# Patient Record
Sex: FEMALE | Race: OTHER | HISPANIC OR LATINO | Employment: FULL TIME | URBAN - METROPOLITAN AREA
[De-identification: names, ages, dates, MRNs, and addresses within clinical notes are randomized per-mention and may not be internally consistent; named-entity substitution may affect disease eponyms.]

---

## 2018-05-17 ENCOUNTER — HOSPITAL ENCOUNTER (EMERGENCY)
Facility: HOSPITAL | Age: 28
Discharge: HOME | End: 2018-05-18
Attending: EMERGENCY MEDICINE | Admitting: EMERGENCY MEDICINE
Payer: COMMERCIAL

## 2018-05-17 VITALS
TEMPERATURE: 97.6 F | HEART RATE: 95 BPM | RESPIRATION RATE: 20 BRPM | OXYGEN SATURATION: 99 % | DIASTOLIC BLOOD PRESSURE: 58 MMHG | SYSTOLIC BLOOD PRESSURE: 126 MMHG | WEIGHT: 142 LBS

## 2018-05-17 DIAGNOSIS — J06.9 URI (UPPER RESPIRATORY INFECTION): Primary | ICD-10-CM

## 2018-05-17 RX ADMIN — DEXAMETHASONE SODIUM PHOSPHATE 10 MG: 10 INJECTION, SOLUTION INTRAMUSCULAR; INTRAVENOUS at 23:55

## 2018-05-18 PROCEDURE — 99283 EMERGENCY DEPT VISIT LOW MDM: CPT

## 2018-05-18 NOTE — ED PROVIDER NOTES
History  Chief Complaint   Patient presents with    Cough     Patient reports cough for the past three days along with a headache and chest congestion  Patient denies fevers  32 yof with uri sx  Onset yesterday  Tactile fever  Cough, non productive  Sore throat  No a/e factors  No other assoc sx  Denies cp, sob  Does smoke  Sick contacts include sig other  Exam benign  A/P: uri  supporitve care  None       History reviewed  No pertinent past medical history  History reviewed  No pertinent surgical history  History reviewed  No pertinent family history  I have reviewed and agree with the history as documented  Social History   Substance Use Topics    Smoking status: Current Every Day Smoker     Packs/day: 0 50     Types: Cigarettes    Smokeless tobacco: Never Used    Alcohol use Yes        Review of Systems   Constitutional: Negative for chills, fatigue and fever  HENT: Positive for sore throat  Eyes: Negative for photophobia and visual disturbance  Respiratory: Positive for cough  Negative for shortness of breath  Cardiovascular: Negative for chest pain, palpitations and leg swelling  Gastrointestinal: Negative for diarrhea, nausea and vomiting  Endocrine: Negative for polydipsia and polyuria  Genitourinary: Negative for decreased urine volume, difficulty urinating, dysuria and frequency  Musculoskeletal: Negative for back pain, neck pain and neck stiffness  Skin: Negative for color change and rash  Allergic/Immunologic: Negative for environmental allergies and immunocompromised state  Neurological: Negative for dizziness and headaches  Hematological: Negative for adenopathy  Does not bruise/bleed easily  Psychiatric/Behavioral: Negative for dysphoric mood  The patient is not nervous/anxious          Physical Exam  ED Triage Vitals [05/17/18 2225]   Temperature Pulse Respirations Blood Pressure SpO2   97 6 °F (36 4 °C) 95 20 126/58 99 %      Temp Source Heart Rate Source Patient Position - Orthostatic VS BP Location FiO2 (%)   Tympanic Monitor Sitting Left arm --      Pain Score       9           Orthostatic Vital Signs  Vitals:    05/17/18 2225   BP: 126/58   Pulse: 95   Patient Position - Orthostatic VS: Sitting       Physical Exam   Constitutional: She is oriented to person, place, and time  She appears well-developed and well-nourished  No distress  HENT:   Head: Normocephalic and atraumatic  Nose: Nose normal    Eyes: Conjunctivae and EOM are normal  Pupils are equal, round, and reactive to light  No scleral icterus  Neck: Normal range of motion  Neck supple  No JVD present  No tracheal deviation present  No thyromegaly present  Cardiovascular: Normal rate, regular rhythm, normal heart sounds and intact distal pulses  Exam reveals no gallop and no friction rub  Pulmonary/Chest: Effort normal and breath sounds normal  No respiratory distress  She has no wheezes  She has no rales  She exhibits no tenderness  Abdominal: Soft  Bowel sounds are normal  She exhibits no distension and no mass  There is no tenderness  There is no rebound and no guarding  No hernia  Musculoskeletal: Normal range of motion  She exhibits no edema, tenderness or deformity  Neurological: She is alert and oriented to person, place, and time  She has normal reflexes  No cranial nerve deficit  Coordination normal    Skin: Skin is warm and dry  She is not diaphoretic  No erythema  Psychiatric: She has a normal mood and affect  Her behavior is normal    Nursing note and vitals reviewed        ED Medications  Medications   dexamethasone 10 mg/mL oral liquid 10 mg 1 mL (10 mg Oral Given 5/17/18 4047)       Diagnostic Studies  Results Reviewed     None                 No orders to display         Procedures  Procedures      Phone Consults  ED Phone Contact    ED Course                               MDM  Number of Diagnoses or Management Options  URI (upper respiratory infection): new and requires workup    CritCare Time    Disposition  Final diagnoses:   URI (upper respiratory infection)     Time reflects when diagnosis was documented in both MDM as applicable and the Disposition within this note     Time User Action Codes Description Comment    5/17/2018 11:36 PM Michelle Roach Add [J06 9] URI (upper respiratory infection)       ED Disposition     ED Disposition Condition Comment    Discharge  [facility] discharge to home/self care  Condition at discharge: Good        Follow-up Information     Follow up With Specialties Details Why Contact Info    Philip Mejia DO Family Medicine  If symptoms worsen [address]  [place] [phone]          There are no discharge medications for this patient  No discharge procedures on file  ED Provider  Attending physically available and evaluated Eneida Malik I managed the patient along with the ED Attending      Electronically Signed by         Omar Aguilar DO  05/18/18 0119

## 2018-05-18 NOTE — DISCHARGE INSTRUCTIONS
Bronquitis aguda   LO QUE NECESITA SABER:   La bronquitis aguda es la inflamación e irritación de juju vías respiratorias  Esta irritación puede provocar que tosa o que tenga otros problemas de respiración  La bronquitis aguda suele comenzar debido a otra enfermedad, coby un resfriado o la gripe  La enfermedad se propaga de griffin nariz y garganta a griffin tráquea y Reesa Clement  La bronquitis a menudo es conocida coby resfriado de pecho  La bronquitis aguda generalmente dura alrededor de 3 a 6 semanas y no es median enfermedad grave  La tos podría durar por varias semanas  INSTRUCCIONES SOBRE EL SINA HOSPITALARIA:   Regrese a la zahra de emergencias si:   · Usted expectora donell  · Juju labios o uñas de los dedos se ponen azules  · Usted siente que no está recibiendo suficiente aire cuando respira  Pregúntele a griffin Jocy Diesel vitaminas y minerales son adecuados para usted  · Usted tiene fiebre  · Juju problemas respiratorios no desaparecen o empeoran  · Griffin tos no mejora dentro de 4 semanas  · Usted tiene preguntas o inquietudes acerca de griffin condición o cuidado  Cuidados personales:   · Descanse más  El descanso ayuda a griffin cuerpo a sanar  Empiece a hacer un poco más día a día  Descanse cuando usted sienta que es necesario  · Evite irritantes en el aire  Evite productos químicos, gases y polvo  Use medina mascarilla si tiene que trabajar alrededor de polvo o gases  Permanezca dentro cuando los niveles de contaminación maryam altos  Si tiene alergias, permanezca adentro cuando el conteo de polen sea CROSSCANONBY  No use productos en aerosol, coby desodorante, aerosol contra los insectos y aerosol para el nohemi  · No fume o esté alrededor de personas que fuman  La nicotina y otros químicos en los cigarrillos y cigarros dañan la cilia que saca la mucosidad de juju pulmones  Pida información a griffin médico si usted actualmente fuma y necesita ayuda para dejar de fumar   Los cigarrillos electrónicos o tabaco sin humo todavía contienen nicotina  Consulte con griffin médico antes de [employer]  · [address] se le haya indicado  Los líquidos ayudan a mantener humectadas darryl vías respiratorias y ayudarle a eliminar las flemas por medio de la tos  Es posible que usted necesite joey más líquidos si tiene bronquitis [place]  Pregunte cuánto líquido debe joey cada día y cuáles líquidos son los más adecuados para usted  · Use un humidificador o vaporizador  Use un humidificador de raya frío o un vaporizador para elevar la humedad en griffin casa  [place] podría ayudarle a respirar más fácilmente y al mismo tiempo disminuir la tos  Disminuya griffin riesgo para la bronquitis aguda:   · Vaya a que le pongan las vacunas necesarias  Pregúntele a griffin médico si usted debería ser vacunado contra la gripe o la neumonía  · Evite la propagación de gérmenes  Usted puede disminuir griffin riesgo de bronquitis [place] y otras enfermedades de la siguiente manera:     ¨ [place] frecuentemente con agua y [place]  Lleve medina loción o gel antiséptico para las alcira  Usted puede usar la loción o el gel para limpiar darryl alcira cuando no haya agua disponible  ¨ No se toque los ojos, la nariz o la boca a menos que se haya lavado las alcira ponce  ¨ Siempre cubra griffin boca al toser para evitar la propagación de gérmenes  Lo mejor es toser en un pañuelo desechable o en la manga de griffin camisa, en lugar de en griffin mano  [place] a los que le rodean que cubran darryl bocas al toser  ¨ Trate de evitar a las personas que están resfriadas o tienen gripe  Si usted está enfermo, manténgase alejado de otras personas lo más que pueda  Medicamentos:  Griffin médico podría  darle cualquiera de lo siguiente:  · El ibuprofeno o el acetaminofeno  son medicamentos que pueden ayudar a bajar griffin fiebre  Están disponibles sin receta médica  Consulte con griffin médico cuál medicamento es el adecuado para usted  Pregunte la cantidad y la frecuencia con que debe tomarlos  [address]  Estos medicamentos pueden provocar sangrado estomacal si no se adrián correctamente  El ibuprofeno puede provocar daño al [place] K[place]  No tome ibuprofeno si usted tiene enfermedad de los riñones, [place] o si es alérgico a la aspirina  El acetaminofeno puede dañar el hígado  No tome más de 4,000 miligramos en 24 horas  · Los descongestionantes  ayudan a despejar la mucosidad en darryl pulmones y que sea más fácil expectorarla  [place] puede ayudarle a respirar mejor  · Los jarabes para la tos  disminuyen foster necesidad de toser  Si foster tos produce mucosidad, no tome un supresor de la tos a menos que foster médico se lo indique  Foster médico podría sugerirle que tome un supresor de la tos en la noche para que pueda descansar  · Inhaladores  podrían ser Goldie Giovani  Foster médico podría darle connor o más inhaladores para ayudarle a respirar más fácil y [place] menos tos  Un inhalador le administra medicamento para abrir darryl vías aéreas  Pídale a foster médico que le muestre cómo usar foster inhalador correctamente  · Nason darryl medicamentos coby se le haya indicado  Consulte con foster médico si usted caryl que foster medicamento no le está ayudando o si presenta efectos secundarios  Infórmele si es alérgico a algún medicamento  Mantenga medina lista actualizada de los [place], las vitaminas y los productos herbales que ric  Incluya los siguientes datos de los medicamentos: cantidad, frecuencia y motivo de administración  Traiga con usted la lista o los envases de la píldoras a darryl citas de seguimiento  Lleve la lista de los medicamentos con usted en taylor de medina emergencia  Acuda a darryl consultas de control con foster médico según le indicaron  Escriba las preguntas que tenga para que recuerde hacerlas suad darryl citas de seguimiento  © 2017 [address] Information is for End User's use only and may not be sold, redistributed or otherwise used for commercial purposes   All illustrations and images included in [place] are the copyrighted property of A D A M , Inc  or Monty Neal  Esta información es sólo para uso en educación  Griffin intención no es darle un consejo médico sobre enfermedades o tratamientos  Colsulte con griffin Bary Bert farmacéutico antes de seguir cualquier régimen médico para saber si es seguro y efectivo para usted

## 2018-05-18 NOTE — ED ATTENDING ATTESTATION
Jose Luis MCMAHON DO, saw and evaluated the patient  I have discussed the patient with the resident/non-physician practitioner and agree with the resident's/non-physician practitioner's findings, Plan of Care, and MDM as documented in the resident's/non-physician practitioner's note, except where noted  All available labs and Radiology studies were reviewed  At this point I agree with the current assessment done in the Emergency Department  I have conducted an independent evaluation of this patient a history and physical is as follows:    70-year-old female presents with cough and congestion  Patient is a smoker but denies other medical problems  No fevers, positive sick contact  On exam-no acute distress, heart regular, TMs clear bilaterally, lungs clear, dry cough noted    Plan-Decadron supportive care    Critical Care Time  CritCare Time    Procedures

## 2019-05-17 ENCOUNTER — APPOINTMENT (EMERGENCY)
Dept: RADIOLOGY | Facility: HOSPITAL | Age: 29
End: 2019-05-17
Payer: COMMERCIAL

## 2019-05-17 ENCOUNTER — HOSPITAL ENCOUNTER (EMERGENCY)
Facility: HOSPITAL | Age: 29
Discharge: HOME | End: 2019-05-18
Attending: EMERGENCY MEDICINE
Payer: COMMERCIAL

## 2019-05-17 DIAGNOSIS — R07.89 ATYPICAL CHEST PAIN: Primary | ICD-10-CM

## 2019-05-17 DIAGNOSIS — R51.9 HEADACHE: ICD-10-CM

## 2019-05-17 LAB
EXT PREG TEST URINE: NEGATIVE
TROPONIN I SERPL-MCNC: <0.02 NG/ML

## 2019-05-17 PROCEDURE — 96374 THER/PROPH/DIAG INJ IV PUSH: CPT

## 2019-05-17 PROCEDURE — 96375 TX/PRO/DX INJ NEW DRUG ADDON: CPT

## 2019-05-17 PROCEDURE — 99283 EMERGENCY DEPT VISIT LOW MDM: CPT | Performed by: EMERGENCY MEDICINE

## 2019-05-17 PROCEDURE — 99284 EMERGENCY DEPT VISIT MOD MDM: CPT

## 2019-05-17 PROCEDURE — 36415 COLL VENOUS BLD VENIPUNCTURE: CPT | Performed by: EMERGENCY MEDICINE

## 2019-05-17 PROCEDURE — 84484 ASSAY OF TROPONIN QUANT: CPT | Performed by: EMERGENCY MEDICINE

## 2019-05-17 PROCEDURE — 71046 X-RAY EXAM CHEST 2 VIEWS: CPT

## 2019-05-17 PROCEDURE — 93005 ELECTROCARDIOGRAM TRACING: CPT

## 2019-05-17 PROCEDURE — 81025 URINE PREGNANCY TEST: CPT | Performed by: EMERGENCY MEDICINE

## 2019-05-17 RX ORDER — KETOROLAC TROMETHAMINE 30 MG/ML
15 INJECTION, SOLUTION INTRAMUSCULAR; INTRAVENOUS ONCE
Status: COMPLETED | OUTPATIENT
Start: 2019-05-17 | End: 2019-05-17

## 2019-05-17 RX ORDER — ACETAMINOPHEN 325 MG/1
975 TABLET ORAL ONCE
Status: COMPLETED | OUTPATIENT
Start: 2019-05-17 | End: 2019-05-17

## 2019-05-17 RX ORDER — METOCLOPRAMIDE HYDROCHLORIDE 5 MG/ML
10 INJECTION INTRAMUSCULAR; INTRAVENOUS ONCE
Status: COMPLETED | OUTPATIENT
Start: 2019-05-17 | End: 2019-05-17

## 2019-05-17 RX ADMIN — KETOROLAC TROMETHAMINE 15 MG: 30 INJECTION, SOLUTION INTRAMUSCULAR at 23:16

## 2019-05-17 RX ADMIN — METOCLOPRAMIDE 10 MG: 5 INJECTION, SOLUTION INTRAMUSCULAR; INTRAVENOUS at 23:16

## 2019-05-17 RX ADMIN — ACETAMINOPHEN 975 MG: 325 TABLET, FILM COATED ORAL at 23:16

## 2019-05-18 VITALS
SYSTOLIC BLOOD PRESSURE: 98 MMHG | HEART RATE: 73 BPM | OXYGEN SATURATION: 100 % | DIASTOLIC BLOOD PRESSURE: 56 MMHG | TEMPERATURE: 98.2 F | WEIGHT: 139 LBS | RESPIRATION RATE: 18 BRPM

## 2019-05-18 LAB
ATRIAL RATE: 77 BPM
P AXIS: 66 DEGREES
PR INTERVAL: 178 MS
QRS AXIS: 57 DEGREES
QRSD INTERVAL: 84 MS
QT INTERVAL: 338 MS
QTC INTERVAL: 382 MS
T WAVE AXIS: 50 DEGREES
VENTRICULAR RATE: 77 BPM

## 2019-05-18 PROCEDURE — 93010 ELECTROCARDIOGRAM REPORT: CPT | Performed by: INTERNAL MEDICINE

## 2020-02-21 ENCOUNTER — HOSPITAL ENCOUNTER (EMERGENCY)
Facility: HOSPITAL | Age: 30
Discharge: HOME | End: 2020-02-21
Attending: EMERGENCY MEDICINE
Payer: COMMERCIAL

## 2020-02-21 VITALS
BODY MASS INDEX: 23.4 KG/M2 | WEIGHT: 158 LBS | DIASTOLIC BLOOD PRESSURE: 71 MMHG | RESPIRATION RATE: 18 BRPM | OXYGEN SATURATION: 97 % | HEART RATE: 95 BPM | TEMPERATURE: 97.6 F | HEIGHT: 69 IN | SYSTOLIC BLOOD PRESSURE: 110 MMHG

## 2020-02-21 DIAGNOSIS — Z20.828 EXPOSURE TO INFLUENZA: Primary | ICD-10-CM

## 2020-02-21 LAB
FLUAV RNA NPH QL NAA+PROBE: NORMAL
FLUBV RNA NPH QL NAA+PROBE: NORMAL
RSV RNA NPH QL NAA+PROBE: NORMAL

## 2020-02-21 PROCEDURE — 99283 EMERGENCY DEPT VISIT LOW MDM: CPT

## 2020-02-21 PROCEDURE — 99284 EMERGENCY DEPT VISIT MOD MDM: CPT | Performed by: PHYSICIAN ASSISTANT

## 2020-02-21 PROCEDURE — 87631 RESP VIRUS 3-5 TARGETS: CPT | Performed by: PHYSICIAN ASSISTANT

## 2020-02-21 RX ORDER — IBUPROFEN 600 MG/1
600 TABLET ORAL ONCE
Status: COMPLETED | OUTPATIENT
Start: 2020-02-21 | End: 2020-02-21

## 2020-02-21 RX ORDER — OSELTAMIVIR PHOSPHATE 75 MG/1
75 CAPSULE ORAL DAILY
Qty: 10 CAPSULE | Refills: 0 | Status: SHIPPED | OUTPATIENT
Start: 2020-02-21 | End: 2020-03-02

## 2020-02-21 RX ADMIN — IBUPROFEN 600 MG: 600 TABLET ORAL at 14:13

## 2020-02-21 NOTE — DISCHARGE INSTRUCTIONS
Influenza   LO QUE NECESITA SABER:   La influenza (gripe) es medina infección provocada por el virus de la influenza  La gripe se propaga fácilmente cuando medina persona infectada tose, estornuda o tiene contacto cercano con otras personas  Usted podría propagar la gripe a otras personas por medina semana o más después de que aparezcan los signos o síntomas  INSTRUCCIONES SOBRE EL SINA HOSPITALARIA:   Llame al 911 en taylor de presentar lo siguiente:   · Tiene dificultad para respirar, y juju labios lucen púrpuras o azules  · Usted sufre medina convulsión  Regrese a la zahra de emergencias si:   · Usted se siente mareado, orina poco o no orina  · Usted tiene dolor de adan con rigidez en el maira y se siente cansado o confundido  · Usted comienza a sentir dolor o presión en el pecho  · Juju síntomas, coby falta de [place], vómito o [place], [place]  · Juju síntomas, coby fiebre y tos, parecen mejorar yuniel Evins Brigido  Pregúntele a griffin Magaly Sandoval vitaminas y minerales son adecuados para usted  · Usted tiene dolor muscular o debilidad nuevos  · Usted tiene preguntas o inquietudes acerca de griffin condición o cuidado  Medicamentos:  Es posible que usted necesite alguno de los siguientes:  · El acetaminofén  mel el dolor y baja la fiebre  Está disponible sin receta médica  Pregunte la cantidad y la frecuencia con que debe tomarlos  [place]  El acetaminofén puede causar daño en el hígado cuando no se ric de forma correcta  · AINEs (Analgésicos antiinflamatorios no esteroides) coby el ibuprofeno, ayudan a disminuir la inflamación, el dolor y la [place]  Betty medicamento esta disponible con o sin medina receta médica  Los AINEs pueden causar sangrado estomacal o problemas renales en ciertas personas  Si usted ric un medicamento anticoagulante, siempre pregúntele a griffin médico si los DEBORAH son seguros para usted  Siempre karl la etiqueta de betty medicamento y [place] instrucciones      · Los antivirales  ayudan a combatir medina infección viral     · [place] darryl medicamentos coby se le haya indicado  Consulte con griffin médico si usted caryl que griffin medicamento no le está ayudando o si presenta efectos secundarios  Infórmele si es alérgico a cualquier medicamento  Mantenga medina lista actualizada de los [place], las vitaminas y los productos herbales que ric  Incluya los siguientes datos de los medicamentos: cantidad, frecuencia y motivo de administración  Traiga con usted la lista o los envases de la píldoras a darryl citas de seguimiento  Lleve la lista de los medicamentos con usted en taylor de medina emergencia  Descanse  tanto coby pueda para recuperarse  Consuma líquidos según le indicaron  para evitar la deshidratación  Pregunte cuánto líquido debe joey cada día y cuáles líquidos son los más adecuados para usted  Prevenga la propagación de la gripe:   · Lávese las alcira frecuentemente  Utilice agua y [place]  [employer] las alcira después de usar el baño, cambiarle el pañal a un phillip o estornudar  Lávese las alcira antes de comer o preparar alimentos  Use un gel desinfectante si no tiene [place] y [place]  No se toque los ojos, la nariz o la boca a menos que se haya lavado las alcira ponce  · Cúbrase la boca al toser o estornudar  Tosa en un pañuelo desechable o en pliegue de griffin brazo  · Limpie los artículos que se comparten con medina solución de limpieza fish gérmenes  [address]  [place] trace y los interruptores mila  No comparta toallas, cubiertos ni platos con personas con síntomas de medina enfermedad  Lave las sábanas, [place], cubiertos y vajilla con agua y [place]  · Use medina mascarilla  para cubrir griffin boca y [place] and Daphnie si usted está enfermo o [place] persona cerca a usted tiene gripe  · Manténgase alejado de otros  si está enfermo  · La vacuna contra la gripe  ayuda a prevenir influenza (gripe)   Renae Hernandez persona mayor de 6 meses de edad debería recibir medina vacuna contra la gripe anualmente  Reciba la vacuna tan pronto esté disponible, generalmente suad los meses de septiembre u [place]  Acuda a darryl consultas de control con foster médico según le indicaron  Anote darryl preguntas para que se acuerde de hacerlas suad darryl visitas  © 2017 [address] Information is for End User's use only and may not be sold, redistributed or otherwise used for commercial purposes  All illustrations and images included in CareNotes® are the copyrighted property of A D A M , Inc  or Monty Neal  Esta información es sólo para uso en educación  Foster intención no es darle un consejo médico sobre enfermedades o tratamientos  Colsulte con foster Mata Twila farmacéutico antes de seguir cualquier régimen médico para saber si es seguro y efectivo para usted

## 2020-02-21 NOTE — ED PROVIDER NOTES
History  Chief Complaint   Patient presents with    Cough     Mother reports cough and headache x2 days  Denies fever  22-year-old female presents emergency room for evaluation of body aches and pains  Onset yesterday  Worse today  Associated with headache and cough  No fever  No sore throat or nasal congestion  No vomiting or diarrhea  No rash  Children are sick with similar symptoms  Patient did have the flu shot  No past medical problems  No self-treatment  History provided by:  Patient      None       History reviewed  No pertinent past medical history  History reviewed  No pertinent surgical history  History reviewed  No pertinent family history  I have reviewed and agree with the history as documented  Social History     Tobacco Use    Smoking status: Current Every Day Smoker     Packs/day: 0 50     Types: Cigarettes    Smokeless tobacco: Never Used   Substance Use Topics    Alcohol use: Yes    Drug use: No       Review of Systems   Constitutional: Positive for fatigue  Negative for fever  HENT: Negative for congestion, ear pain and sore throat  Eyes: Negative for discharge and redness  Respiratory: Positive for cough  Gastrointestinal: Negative for diarrhea and vomiting  Musculoskeletal: Positive for myalgias  Skin: Negative for rash  Neurological: Positive for headaches  Physical Exam  Physical Exam   Constitutional: She appears well-developed and well-nourished  HENT:   Right Ear: Tympanic membrane and external ear normal    Left Ear: Tympanic membrane and external ear normal    Nose: Nose normal  No rhinorrhea  Mouth/Throat: Uvula is midline, oropharynx is clear and moist and mucous membranes are normal  No tonsillar exudate  Eyes: Conjunctivae are normal    Neck: Neck supple  Cardiovascular: Normal rate, regular rhythm and normal heart sounds     Pulmonary/Chest: Effort normal and breath sounds normal    Lymphadenopathy:     She has cervical adenopathy  Neurological: She is alert  Skin: Skin is warm and dry  No rash noted  Psychiatric: She has a normal mood and affect  Nursing note and vitals reviewed  Vital Signs  ED Triage Vitals [02/21/20 1314]   Temperature Pulse Respirations Blood Pressure SpO2   97 6 °F (36 4 °C) 95 18 110/71 97 %      Temp Source Heart Rate Source Patient Position - Orthostatic VS BP Location FiO2 (%)   Oral Monitor Lying Right arm --      Pain Score       7           Vitals:    02/21/20 1314   BP: 110/71   Pulse: 95   Patient Position - Orthostatic VS: Lying         Visual Acuity      ED Medications  Medications   ibuprofen (MOTRIN) tablet 600 mg (600 mg Oral Given 2/21/20 1413)       Diagnostic Studies  Results Reviewed     Procedure Component Value Units Date/Time    Influenza A/B and RSV PCR [[id number]]  (Normal) Collected:  02/21/20 1413    Lab Status:  Final result Specimen:  Nasopharyngeal Swab Updated:  02/21/20 1508     INFLUENZA A PCR None Detected     INFLUENZA B PCR None Detected     RSV PCR None Detected                 No orders to display              Procedures  Procedures         ED Course                               MDM  Number of Diagnoses or Management Options  Exposure to influenza:   Risk of Complications, Morbidity, and/or Mortality  General comments: Mother's children tested positive for the flu A    Patient Progress  Patient progress: stable        Disposition  Final diagnoses:   Exposure to influenza     Time reflects when diagnosis was documented in both MDM as applicable and the Disposition within this note     Time User Action Codes Description Comment    2/21/2020  3:34 PM Merissa Walls Add [Z20 828] Exposure to influenza       ED Disposition     ED Disposition Condition Date/Time Comment    Discharge Stable Fri Feb 21, 2020  3:34 PM Wendy Levine discharge to home/self care              Follow-up Information     Follow up With Specialties Details Why Contact Info Additional Information Nhi Wu, DO Family Medicine In 1 week  [address] Emergency Department Emergency Medicine  If symptoms worsen [address]  [phone]  ED, [address], [place] [address] [place] [address], [address]   [phone]          Patient's Medications   Discharge Prescriptions    OSELTAMIVIR (TAMIFLU) 75 MG CAPSULE    Take 1 capsule (75 mg total) by mouth daily for 10 days       Start Date: 2/21/2020 End Date: 3/2/2020       Order Dose: 75 mg       Quantity: 10 capsule    Refills: 0     No discharge procedures on file      PDMP Review     None          ED Provider  Electronically Signed by           Monserrat Sommer PA-C  02/21/20 [address] [facility], [place]  02/21/20 2808

## 2020-03-05 ENCOUNTER — HOSPITAL ENCOUNTER (EMERGENCY)
Facility: HOSPITAL | Age: 30
Discharge: HOME | End: 2020-03-05
Attending: EMERGENCY MEDICINE | Admitting: EMERGENCY MEDICINE
Payer: COMMERCIAL

## 2020-03-05 VITALS
RESPIRATION RATE: 18 BRPM | SYSTOLIC BLOOD PRESSURE: 113 MMHG | HEART RATE: 88 BPM | TEMPERATURE: 98.4 F | OXYGEN SATURATION: 99 % | DIASTOLIC BLOOD PRESSURE: 57 MMHG

## 2020-03-05 DIAGNOSIS — Z76.89 RETURN TO WORK EVALUATION: Primary | ICD-10-CM

## 2020-03-05 PROCEDURE — 99282 EMERGENCY DEPT VISIT SF MDM: CPT | Performed by: EMERGENCY MEDICINE

## 2020-03-05 PROCEDURE — 99283 EMERGENCY DEPT VISIT LOW MDM: CPT

## 2020-03-05 NOTE — ED PROVIDER NOTES
History  Chief Complaint   Patient presents with    Flu Symptoms     Seen for flu a week ago and she needs an excuse to return to work     The patient is a 80-year-old female with no significant past medical history who presents to the emergency department for re-evaluation following a recent illness  The patient states she was seen here approximately 1 week ago for flu-like symptoms  She states she tested negative, however both of her children tested positive for the flu  She states she had several subsequent days of symptoms  She states that she missed a total of 5 days of work due to her symptoms  She states she was given a note here by the provider who saw her on the 26th to return to work on the 28th  She states that she has since return to work however due to the fact that she missed 5 days, they are making her fill out paperwork for family leave  She is requesting that we fill out the paperwork for the family leave  She states that her symptoms have since resolved and she feels back to normal at this time  History provided by:  Patient   used: No    Flu Symptoms   Presenting symptoms: no cough, no diarrhea, no fever, no headaches, no nausea, no shortness of breath, no sore throat and no vomiting    Associated symptoms: no chills, no ear pain and no neck stiffness        None       History reviewed  No pertinent past medical history  History reviewed  No pertinent surgical history  History reviewed  No pertinent family history  I have reviewed and agree with the history as documented  E-Cigarette/Vaping     E-Cigarette/Vaping Substances     Social History     Tobacco Use    Smoking status: Current Every Day Smoker     Packs/day: 0 50     Types: Cigarettes    Smokeless tobacco: Never Used   Substance Use Topics    Alcohol use: Yes    Drug use: No       Review of Systems   Constitutional: Negative for chills and fever  HENT: Negative for ear pain and sore throat  Eyes: Negative for redness and visual disturbance  Respiratory: Negative for cough and shortness of breath  Cardiovascular: Negative for chest pain  Gastrointestinal: Negative for abdominal pain, diarrhea, nausea and vomiting  Genitourinary: Negative for dysuria and hematuria  Musculoskeletal: Negative for back pain, neck pain and neck stiffness  Skin: Negative for color change and rash  Neurological: Negative for dizziness, light-headedness and headaches  All other systems reviewed and are negative  Physical Exam  Physical Exam   Constitutional: She is oriented to person, place, and time  She appears well-developed and well-nourished  HENT:   Head: Normocephalic and atraumatic  Eyes: Conjunctivae and EOM are normal    Neck: Normal range of motion  Neck supple  Musculoskeletal: Normal range of motion  Neurological: She is alert and oriented to person, place, and time  Skin: Skin is warm and dry  Vital Signs  ED Triage Vitals [03/05/20 1136]   Temperature Pulse Respirations Blood Pressure SpO2   98 4 °F (36 9 °C) 88 18 113/57 99 %      Temp Source Heart Rate Source Patient Position - Orthostatic VS BP Location FiO2 (%)   Oral Monitor -- -- --      Pain Score       No Pain           Vitals:    03/05/20 1136   BP: 113/57   Pulse: 88         Visual Acuity      ED Medications  Medications - No data to display    Diagnostic Studies  Results Reviewed     None                 No orders to display              Procedures  Procedures         ED Course                               MDM  Number of Diagnoses or Management Options  Return to work evaluation: new and requires workup  Diagnosis management comments: Patient presents to the emergency department to have paperwork filled out for family medical leave  Patient has no complaints today  I advised the patient that 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    I advised that she must be seen to either Occupational Medicine or primary care  She states that she does not have a primary care doctor at this time  I provided the patient with information for the [place] wellness assessment as well as information for info week to facilitate her finding a primary care physician that it might will be covered by her insurance  She acknowledged understanding and agreed with this  Patient stable for discharge  Amount and/or Complexity of Data Reviewed  Decide to obtain previous medical records or to obtain history from someone other than the patient: yes  Review and summarize past medical records: yes    Risk of Complications, Morbidity, and/or Mortality  Presenting problems: minimal  Diagnostic procedures: minimal  Management options: minimal    Patient Progress  Patient progress: stable        Disposition  Final diagnoses:   Return to work evaluation     Time reflects when diagnosis was documented in both MDM as applicable and the Disposition within this note     Time User Action Codes Description Comment    3/5/2020 11:55 AM Tomás Ortiz Add [Z76 [id number]] Return to work evaluation       ED Disposition     ED Disposition Condition Date/Time Comment    Discharge Stable Thu Mar 5, 2020 11:54 AM Loli Bloom discharge to home/self care  Follow-up Information     Follow up With Specialties Details Why Contact Info Additional [address] [address] [facility] Schedule an appointment as soon as possible for a visit   [address] [phone]  [phone] [facility] DOOBKCIP PFSWIN VVXEGMHK [id number], [address] [facility]    [phone]             There are no discharge medications for this patient  No discharge procedures on file      PDMP Review     None          ED Provider  Electronically Signed by           Greta Dinero PA-C  03/05/20 4505